# Patient Record
Sex: MALE | Race: WHITE | NOT HISPANIC OR LATINO | URBAN - METROPOLITAN AREA
[De-identification: names, ages, dates, MRNs, and addresses within clinical notes are randomized per-mention and may not be internally consistent; named-entity substitution may affect disease eponyms.]

---

## 2021-12-23 ENCOUNTER — HOSPITAL ENCOUNTER (EMERGENCY)
Facility: MEDICAL CENTER | Age: 21
End: 2021-12-23
Payer: MEDICAID

## 2021-12-23 VITALS
WEIGHT: 155.87 LBS | BODY MASS INDEX: 20.66 KG/M2 | SYSTOLIC BLOOD PRESSURE: 119 MMHG | TEMPERATURE: 98 F | HEART RATE: 107 BPM | DIASTOLIC BLOOD PRESSURE: 74 MMHG | HEIGHT: 73 IN | OXYGEN SATURATION: 99 % | RESPIRATION RATE: 16 BRPM

## 2021-12-23 PROCEDURE — 302449 STATCHG TRIAGE ONLY (STATISTIC)

## 2021-12-23 NOTE — ED TRIAGE NOTES
Chief Complaint   Patient presents with   • Knee Swelling     knee and lower extremity red, hot to the touch, w/ possible abscess   • Knee Pain     L knee pain, 10/10, burning, painful to walk on     Pt presents to triage for above complaint. He noticed what he thoguth was a white head on 12/19 which he popped, producing pus. Since then site has become increasingly inflamed and painful.
